# Patient Record
Sex: FEMALE | Race: WHITE | NOT HISPANIC OR LATINO | Employment: FULL TIME | URBAN - METROPOLITAN AREA
[De-identification: names, ages, dates, MRNs, and addresses within clinical notes are randomized per-mention and may not be internally consistent; named-entity substitution may affect disease eponyms.]

---

## 2017-03-06 DIAGNOSIS — R73.03 BORDERLINE DIABETES: ICD-10-CM

## 2017-03-06 DIAGNOSIS — E55.9 AVITAMINOSIS D: Primary | ICD-10-CM

## 2017-03-16 ENCOUNTER — LAB (OUTPATIENT)
Dept: ENDOCRINOLOGY | Age: 52
End: 2017-03-16

## 2017-03-16 ENCOUNTER — TRANSCRIBE ORDERS (OUTPATIENT)
Dept: ADMINISTRATIVE | Facility: HOSPITAL | Age: 52
End: 2017-03-16

## 2017-03-16 DIAGNOSIS — E55.9 AVITAMINOSIS D: ICD-10-CM

## 2017-03-16 DIAGNOSIS — R73.03 BORDERLINE DIABETES: ICD-10-CM

## 2017-03-16 DIAGNOSIS — Z12.31 VISIT FOR SCREENING MAMMOGRAM: Primary | ICD-10-CM

## 2017-03-17 LAB
25(OH)D3+25(OH)D2 SERPL-MCNC: 17.3 NG/ML (ref 30–100)
ALBUMIN SERPL-MCNC: 4.4 G/DL (ref 3.5–5.2)
ALBUMIN/GLOB SERPL: 1.6 G/DL
ALP SERPL-CCNC: 81 U/L (ref 39–117)
ALT SERPL-CCNC: 38 U/L (ref 1–33)
AST SERPL-CCNC: 25 U/L (ref 1–32)
BILIRUB SERPL-MCNC: 0.2 MG/DL (ref 0.1–1.2)
BUN SERPL-MCNC: 18 MG/DL (ref 6–20)
BUN/CREAT SERPL: 23.7 (ref 7–25)
C PEPTIDE SERPL-MCNC: 7.9 NG/ML (ref 1.1–4.4)
CALCIUM SERPL-MCNC: 9.6 MG/DL (ref 8.6–10.5)
CHLORIDE SERPL-SCNC: 101 MMOL/L (ref 98–107)
CHOLEST SERPL-MCNC: 202 MG/DL (ref 0–200)
CO2 SERPL-SCNC: 23.3 MMOL/L (ref 22–29)
CREAT SERPL-MCNC: 0.76 MG/DL (ref 0.57–1)
GLOBULIN SER CALC-MCNC: 2.7 GM/DL
GLUCOSE SERPL-MCNC: 80 MG/DL (ref 65–99)
HBA1C MFR BLD: 5.85 % (ref 4.8–5.6)
HDLC SERPL-MCNC: 30 MG/DL (ref 40–60)
LDLC SERPL CALC-MCNC: 108 MG/DL (ref 0–100)
MICROALBUMIN UR-MCNC: <3 UG/ML
POTASSIUM SERPL-SCNC: 4.7 MMOL/L (ref 3.5–5.2)
PROT SERPL-MCNC: 7.1 G/DL (ref 6–8.5)
SODIUM SERPL-SCNC: 139 MMOL/L (ref 136–145)
TRIGL SERPL-MCNC: 318 MG/DL (ref 0–150)
VLDLC SERPL CALC-MCNC: 63.6 MG/DL (ref 5–40)

## 2017-03-30 ENCOUNTER — OFFICE VISIT (OUTPATIENT)
Dept: ENDOCRINOLOGY | Age: 52
End: 2017-03-30

## 2017-03-30 VITALS
BODY MASS INDEX: 44.52 KG/M2 | DIASTOLIC BLOOD PRESSURE: 70 MMHG | WEIGHT: 267.2 LBS | SYSTOLIC BLOOD PRESSURE: 138 MMHG | HEIGHT: 65 IN

## 2017-03-30 DIAGNOSIS — E55.9 AVITAMINOSIS D: ICD-10-CM

## 2017-03-30 DIAGNOSIS — E78.5 HYPERLIPIDEMIA, UNSPECIFIED HYPERLIPIDEMIA TYPE: ICD-10-CM

## 2017-03-30 DIAGNOSIS — R53.82 CHRONIC FATIGUE: ICD-10-CM

## 2017-03-30 DIAGNOSIS — R63.5 ABNORMAL WEIGHT GAIN: ICD-10-CM

## 2017-03-30 DIAGNOSIS — I10 BENIGN ESSENTIAL HTN: ICD-10-CM

## 2017-03-30 DIAGNOSIS — E66.9 ADIPOSITY: ICD-10-CM

## 2017-03-30 DIAGNOSIS — R73.03 BORDERLINE DIABETES: Primary | ICD-10-CM

## 2017-03-30 PROCEDURE — 99214 OFFICE O/P EST MOD 30 MIN: CPT | Performed by: NURSE PRACTITIONER

## 2017-03-30 RX ORDER — ERGOCALCIFEROL 1.25 MG/1
CAPSULE ORAL
Qty: 24 CAPSULE | Refills: 1 | Status: SHIPPED | OUTPATIENT
Start: 2017-03-30

## 2017-03-30 RX ORDER — AMLODIPINE BESYLATE 10 MG/1
10 TABLET ORAL DAILY
Qty: 90 TABLET | Refills: 1 | Status: SHIPPED | OUTPATIENT
Start: 2017-03-30 | End: 2018-03-30

## 2017-03-30 RX ORDER — LIRAGLUTIDE 6 MG/ML
INJECTION SUBCUTANEOUS
Qty: 9 ML | Refills: 3 | Status: SHIPPED | OUTPATIENT
Start: 2017-03-30

## 2017-03-30 NOTE — PROGRESS NOTES
"Subjective   Rachel Villatoro is a 51 y.o. female is here today for follow-up.  Chief Complaint   Patient presents with   • borderline diabetes     recent labs; doesnt test BG   • Vitamin D Deficiency     stopped colestid & vascepa due to not being able to follow sig   • Hypertension     havent been taking the Vit D due to not calling and letting know refill needed   • Hyperlipidemia     /80  Ht 65\" (165.1 cm)  Wt 267 lb 3.2 oz (121 kg)  BMI 44.46 kg/m2  Allergies   Allergen Reactions   • Fenofibrate      Severe headaches,  Breaks out in sweat   • Paroxetine      hives  hives   • Shellfish-Derived Products    • Statins    • Iodine Rash     Past Medical History:   Diagnosis Date   • Endometriosis    • Hyperlipidemia    • Hypertension    • Metabolic disorder    • Vitamin D deficiency      Past Surgical History:   Procedure Laterality Date   • BREAST BIOPSY     • CHOLECYSTECTOMY     • OOPHORECTOMY      BOTH   • TUBAL ABDOMINAL LIGATION       Social History     Social History   • Marital status:      Spouse name: N/A   • Number of children: N/A   • Years of education: N/A     Social History Main Topics   • Smoking status: Never Smoker   • Smokeless tobacco: None   • Alcohol use None   • Drug use: None   • Sexual activity: Not Asked     Other Topics Concern   • None     Social History Narrative     History reviewed. No pertinent family history.  Current Outpatient Prescriptions on File Prior to Visit   Medication Sig   • EPINEPHrine (EPIPEN) 0.3 MG/0.3ML solution auto-injector injection EPINEPHrine 0.3 MG/0.3ML NATALY; Patient Sig: EPINEPHrine 0.3 MG/0.3ML NATALY INJECT ML  PRN; 0; 19-Jun-2013; Active   • fenofibrate micronized (ANTARA) 130 MG capsule Take 1 capsule by mouth daily.   • furosemide (LASIX) 40 MG tablet Take 1 tablet by mouth daily.   • Insulin Pen Needle 32G X 5 MM misc USE AS DIRECTED WITH INSULIN INJECTIONS   • metFORMIN (GLUCOPHAGE) 1000 MG tablet Take 1 tablet by mouth daily with breakfast.   • " olmesartan (BENICAR) 40 MG tablet Take 1 tablet by mouth Daily.   • potassium chloride (KLOR-CON) 8 MEQ CR tablet Take 1 tablet by mouth daily.   • VICTOZA 18 MG/3ML solution pen-injector Inject 1.8 mg Daily   • colestipol (COLESTID) 1 G tablet Take 3 tablets by mouth 2 (Two) Times a Day.   • VASCEPA 1 G capsule 2 po bid with food   • vitamin D (ERGOCALCIFEROL) 05097 UNITS capsule capsule Take 1 capsule by mouth once a week.     No current facility-administered medications on file prior to visit.        History of Present Illness  Encounter Diagnoses   Name Primary?   • Borderline diabetes Yes   • Hyperlipidemia, unspecified hyperlipidemia type    • Benign essential HTN    • Avitaminosis D    • Adiposity    • Chronic fatigue    • Abnormal weight gain      This is a 51-year-old female patient seen today for routine follow-up for the above-mentioned problems.  She had recent labs which were reviewed and discussed.  It is noted that her blood pressure was high on initial check at 150/80. It was rechecked and found to be 138/70.  She states she does check her blood pressure at home and that this is around what it normally runs.  She was started on Colestid and vascepa at her last visit but didn't take them related to the timing of Colestid with other medications and her work schedule and she was unsure about starting both of those medications for cholesterol.  She reports that she has been remodeling her kitchen and has been unable to eat healthy for quite a while.  She reports and very low energy and that she has not been taking vitamin D for about a month because of an issue with refills.  She also reports that she was working 2 jobs previously but she has now went down to one job and will be able to increase her exercise to help facilitate weight loss and take her medications as prescribed.  She reports that she takes her Benicar Victoza, fenofibrate, and metformin as indicated and she takes her Lasix every other day  with a potassium supplement.    She has had no acute illness her recent visit the emergency department since her last visit but she states she will be having surgery on her back for repair of a slipped disc.     The following portions of the patient's history were reviewed and updated as appropriate: allergies, current medications, past family history, past medical history, past social history, past surgical history and problem list.    Review of Systems   Constitutional: Negative for fatigue.   HENT: Negative for trouble swallowing.    Eyes: Negative for visual disturbance.   Respiratory: Negative for shortness of breath.    Cardiovascular: Negative for leg swelling.   Gastrointestinal: Negative for nausea.   Endocrine: Negative for polyphagia.   Genitourinary: Negative for urgency.   Musculoskeletal: Negative for joint swelling.   Skin: Negative for wound.   Allergic/Immunologic: Negative for immunocompromised state.   Neurological: Negative for numbness.   Hematological: Negative for adenopathy.   Psychiatric/Behavioral: The patient is not nervous/anxious.        Objective   Physical Exam   Constitutional: She is oriented to person, place, and time. She appears well-developed and well-nourished. No distress.   HENT:   Head: Normocephalic and atraumatic.   Right Ear: External ear normal.   Left Ear: External ear normal.   Nose: Nose normal.   Mouth/Throat: Oropharynx is clear and moist. No oropharyngeal exudate.   Eyes: EOM are normal. Pupils are equal, round, and reactive to light. Right eye exhibits no discharge. Left eye exhibits no discharge.   Neck: Trachea normal, normal range of motion and full passive range of motion without pain. Neck supple. No tracheal tenderness present. Carotid bruit is not present. No tracheal deviation, no edema and no erythema present. No thyroid mass and no thyromegaly present.   Cardiovascular: Normal rate, regular rhythm, normal heart sounds and intact distal pulses.  Exam  reveals no gallop and no friction rub.    No murmur heard.  Pulmonary/Chest: Effort normal and breath sounds normal. No stridor. No respiratory distress. She has no wheezes. She has no rales.   Abdominal: Soft. Bowel sounds are normal. She exhibits no distension.   Musculoskeletal: Normal range of motion. She exhibits no edema or deformity.   Lymphadenopathy:     She has no cervical adenopathy.   Neurological: She is alert and oriented to person, place, and time.   Skin: Skin is warm and dry. No rash noted. She is not diaphoretic. No erythema. No pallor.   Psychiatric: She has a normal mood and affect. Her behavior is normal. Judgment and thought content normal.   Nursing note and vitals reviewed.    Results for orders placed or performed in visit on 03/16/17   Comprehensive Metabolic Panel   Result Value Ref Range    Glucose 80 65 - 99 mg/dL    BUN 18 6 - 20 mg/dL    Creatinine 0.76 0.57 - 1.00 mg/dL    eGFR Non African Am 80 >60 mL/min/1.73    eGFR African Am 96 >60 mL/min/1.73    BUN/Creatinine Ratio 23.7 7.0 - 25.0    Sodium 139 136 - 145 mmol/L    Potassium 4.7 3.5 - 5.2 mmol/L    Chloride 101 98 - 107 mmol/L    Total CO2 23.3 22.0 - 29.0 mmol/L    Calcium 9.6 8.6 - 10.5 mg/dL    Total Protein 7.1 6.0 - 8.5 g/dL    Albumin 4.40 3.50 - 5.20 g/dL    Globulin 2.7 gm/dL    A/G Ratio 1.6 g/dL    Total Bilirubin 0.2 0.1 - 1.2 mg/dL    Alkaline Phosphatase 81 39 - 117 U/L    AST (SGOT) 25 1 - 32 U/L    ALT (SGPT) 38 (H) 1 - 33 U/L   Lipid Panel   Result Value Ref Range    Total Cholesterol 202 (H) 0 - 200 mg/dL    Triglycerides 318 (H) 0 - 150 mg/dL    HDL Cholesterol 30 (L) 40 - 60 mg/dL    VLDL Cholesterol 63.6 (H) 5 - 40 mg/dL    LDL Cholesterol  108 (H) 0 - 100 mg/dL   Vitamin D 25 Hydroxy   Result Value Ref Range    25 Hydroxy, Vitamin D 17.3 (L) 30.0 - 100.0 ng/mL   Hemoglobin A1c   Result Value Ref Range    Hemoglobin A1C 5.85 (H) 4.80 - 5.60 %   C-Peptide   Result Value Ref Range    C-Peptide 7.9 (H) 1.1 - 4.4  ng/mL   MicroAlbumin, Urine, Random   Result Value Ref Range    Microalbumin, Urine <3.0 Not Estab. ug/mL         Assessment/Plan   Rachel was seen today for borderline diabetes, vitamin d deficiency, hypertension and hyperlipidemia.    Diagnoses and all orders for this visit:    Borderline diabetes    Hyperlipidemia, unspecified hyperlipidemia type    Benign essential HTN    Avitaminosis D    Adiposity    Chronic fatigue    Abnormal weight gain    In summary, the patient was seen and examined. She had recent labs which were reviewed and she was provided a copy.  her most recent labs reveal that she has poor cholesterol control but she states that she will begin taking her Colestid and vascepa as prescribed.  As her blood pressure is still not at goal I have added amlodipine 10 mg 1 pill daily and asked that she continue to check her blood pressure at home.  No one her of the side effects and mechanism of action of this medication and asked that if she finds her blood pressure running too low for her to contact the office for further instruction.  As her vitamin D remains low she is to increase vitamin D to 50,000 units 1 capsule twice weekly.  She is to continue all her medications as prescribed.  A great deal of this appointment was spent counseling the patient on medication adherence and the risk of high cholesterol and hypertension.  She was given a written prescription for her Victoza as well.  I encouraged her to contact the office with any further questions or concerns prior to her next appointment and follow-up in 4 months with labs prior.    Medications at the end of this visit are as follows:    Current Outpatient Prescriptions:   •  colestipol (COLESTID) 1 G tablet, Take 3 tablets by mouth 2 (Two) Times a Day., Disp: 120 tablet, Rfl: 5  •  EPINEPHrine (EPIPEN) 0.3 MG/0.3ML solution auto-injector injection, EPINEPHrine 0.3 MG/0.3ML NATALY; Patient Sig: EPINEPHrine 0.3 MG/0.3ML NATALY INJECT ML  PRN; 0;  19-Jun-2013; Active, Disp: , Rfl:   •  fenofibrate micronized (ANTARA) 130 MG capsule, Take 1 capsule by mouth daily., Disp: , Rfl:   •  furosemide (LASIX) 40 MG tablet, Take 1 tablet by mouth daily., Disp: , Rfl:   •  Insulin Pen Needle 32G X 5 MM misc, USE AS DIRECTED WITH INSULIN INJECTIONS, Disp: , Rfl:   •  metFORMIN (GLUCOPHAGE) 1000 MG tablet, Take 1 tablet by mouth daily with breakfast., Disp: 60 tablet, Rfl: 5  •  olmesartan (BENICAR) 40 MG tablet, Take 1 tablet by mouth Daily., Disp: 90 tablet, Rfl: 0  •  potassium chloride (KLOR-CON) 8 MEQ CR tablet, Take 1 tablet by mouth daily., Disp: , Rfl:   •  VASCEPA 1 G capsule, 2 po bid with food, Disp: 120 capsule, Rfl: 4  •  VICTOZA 18 MG/3ML solution pen-injector, Inject 1.8 mg Daily, Disp: 9 mL, Rfl: 3  •  vitamin D (ERGOCALCIFEROL) 29634 UNITS capsule capsule, Take 1 capsule two times a week, Disp: 24 capsule, Rfl: 1  •  amLODIPine (NORVASC) 10 MG tablet, Take 1 tablet by mouth Daily., Disp: 90 tablet, Rfl: 1      Follow up in 4 months with labs prior  Increase vitamin D 50,000 units 1 capsule twice weekly  Restart Colestid and Vascepa   Amlodipine 10 mg 1 pill daily

## 2017-03-30 NOTE — PATIENT INSTRUCTIONS
Follow up in 4 months with labs prior  Increase vitamin D 50,000 units 1 capsule twice weekly  Restart Colestid and Vascepa   Amlodipine 10 mg 1 pill daily

## 2017-05-25 RX ORDER — OLMESARTAN MEDOXOMIL 40 MG/1
40 TABLET ORAL DAILY
Qty: 90 TABLET | Refills: 0 | Status: SHIPPED | OUTPATIENT
Start: 2017-05-25 | End: 2018-05-25

## 2017-06-12 ENCOUNTER — TRANSCRIBE ORDERS (OUTPATIENT)
Dept: ADMINISTRATIVE | Facility: HOSPITAL | Age: 52
End: 2017-06-12

## 2017-06-12 DIAGNOSIS — Z12.31 VISIT FOR SCREENING MAMMOGRAM: Primary | ICD-10-CM

## 2017-06-19 ENCOUNTER — HOSPITAL ENCOUNTER (OUTPATIENT)
Dept: MAMMOGRAPHY | Facility: HOSPITAL | Age: 52
Discharge: HOME | End: 2017-06-19
Admitting: OBSTETRICS & GYNECOLOGY

## 2017-06-19 DIAGNOSIS — Z12.31 VISIT FOR SCREENING MAMMOGRAM: ICD-10-CM

## 2017-06-19 PROCEDURE — 77063 BREAST TOMOSYNTHESIS BI: CPT

## 2017-06-19 PROCEDURE — G0202 SCR MAMMO BI INCL CAD: HCPCS

## 2017-08-02 DIAGNOSIS — R73.03 BORDERLINE DIABETES: Primary | ICD-10-CM

## 2017-08-02 DIAGNOSIS — E55.9 AVITAMINOSIS D: ICD-10-CM

## 2017-09-02 ENCOUNTER — RESULTS ENCOUNTER (OUTPATIENT)
Dept: ENDOCRINOLOGY | Age: 52
End: 2017-09-02

## 2017-09-02 DIAGNOSIS — R73.03 BORDERLINE DIABETES: ICD-10-CM

## 2017-09-02 DIAGNOSIS — E55.9 AVITAMINOSIS D: ICD-10-CM

## 2018-02-15 RX ORDER — LIRAGLUTIDE 6 MG/ML
INJECTION SUBCUTANEOUS
Refills: 3 | OUTPATIENT
Start: 2018-02-15

## 2018-05-15 ENCOUNTER — TRANSCRIBE ORDERS (OUTPATIENT)
Dept: ADMINISTRATIVE | Facility: HOSPITAL | Age: 53
End: 2018-05-15

## 2018-05-15 DIAGNOSIS — Z12.31 SCREENING MAMMOGRAM, ENCOUNTER FOR: Primary | ICD-10-CM

## 2018-06-25 ENCOUNTER — HOSPITAL ENCOUNTER (OUTPATIENT)
Dept: MAMMOGRAPHY | Facility: HOSPITAL | Age: 53
Discharge: HOME | End: 2018-06-25
Admitting: NURSE PRACTITIONER

## 2018-06-25 DIAGNOSIS — Z12.31 SCREENING MAMMOGRAM, ENCOUNTER FOR: ICD-10-CM

## 2018-06-25 PROCEDURE — 77063 BREAST TOMOSYNTHESIS BI: CPT

## 2018-06-25 PROCEDURE — 77067 SCR MAMMO BI INCL CAD: CPT

## 2018-08-03 ENCOUNTER — ON CAMPUS - OUTPATIENT (OUTPATIENT)
Dept: URBAN - METROPOLITAN AREA HOSPITAL 2 | Facility: HOSPITAL | Age: 53
End: 2018-08-03
Payer: COMMERCIAL

## 2018-08-03 ENCOUNTER — OFFICE (OUTPATIENT)
Dept: URBAN - METROPOLITAN AREA PATHOLOGY 4 | Facility: PATHOLOGY | Age: 53
End: 2018-08-03
Payer: COMMERCIAL

## 2018-08-03 VITALS
DIASTOLIC BLOOD PRESSURE: 56 MMHG | SYSTOLIC BLOOD PRESSURE: 113 MMHG | HEART RATE: 83 BPM | HEART RATE: 96 BPM | OXYGEN SATURATION: 95 % | SYSTOLIC BLOOD PRESSURE: 117 MMHG | RESPIRATION RATE: 16 BRPM | OXYGEN SATURATION: 100 % | SYSTOLIC BLOOD PRESSURE: 188 MMHG | DIASTOLIC BLOOD PRESSURE: 58 MMHG | HEART RATE: 87 BPM | HEART RATE: 95 BPM | SYSTOLIC BLOOD PRESSURE: 99 MMHG | SYSTOLIC BLOOD PRESSURE: 100 MMHG | DIASTOLIC BLOOD PRESSURE: 50 MMHG | DIASTOLIC BLOOD PRESSURE: 122 MMHG | OXYGEN SATURATION: 94 % | HEART RATE: 92 BPM | OXYGEN SATURATION: 96 % | WEIGHT: 265 LBS | HEART RATE: 90 BPM | OXYGEN SATURATION: 98 % | SYSTOLIC BLOOD PRESSURE: 107 MMHG | OXYGEN SATURATION: 97 % | HEIGHT: 65 IN | SYSTOLIC BLOOD PRESSURE: 125 MMHG | DIASTOLIC BLOOD PRESSURE: 73 MMHG | DIASTOLIC BLOOD PRESSURE: 45 MMHG | HEART RATE: 84 BPM | TEMPERATURE: 98.2 F | DIASTOLIC BLOOD PRESSURE: 48 MMHG | SYSTOLIC BLOOD PRESSURE: 96 MMHG

## 2018-08-03 DIAGNOSIS — K62.1 RECTAL POLYP: ICD-10-CM

## 2018-08-03 DIAGNOSIS — K57.30 DIVERTICULOSIS OF LARGE INTESTINE WITHOUT PERFORATION OR ABS: ICD-10-CM

## 2018-08-03 DIAGNOSIS — K64.8 OTHER HEMORRHOIDS: ICD-10-CM

## 2018-08-03 DIAGNOSIS — D12.2 BENIGN NEOPLASM OF ASCENDING COLON: ICD-10-CM

## 2018-08-03 DIAGNOSIS — Z12.11 ENCOUNTER FOR SCREENING FOR MALIGNANT NEOPLASM OF COLON: ICD-10-CM

## 2018-08-03 LAB
GI HISTOLOGY: A. UNSPECIFIED: (no result)
GI HISTOLOGY: B. UNSPECIFIED: (no result)
GI HISTOLOGY: PDF REPORT: (no result)

## 2018-08-03 PROCEDURE — 88305 TISSUE EXAM BY PATHOLOGIST: CPT | Mod: 33 | Performed by: INTERNAL MEDICINE

## 2018-08-03 PROCEDURE — 45380 COLONOSCOPY AND BIOPSY: CPT | Mod: 33 | Performed by: INTERNAL MEDICINE

## 2018-08-03 RX ADMIN — PROPOFOL: 10 INJECTION, EMULSION INTRAVENOUS at 10:47

## 2019-06-01 ENCOUNTER — TRANSCRIBE ORDERS (OUTPATIENT)
Dept: ADMINISTRATIVE | Facility: HOSPITAL | Age: 54
End: 2019-06-01

## 2019-06-01 DIAGNOSIS — Z00.00 WELLNESS EXAMINATION: Primary | ICD-10-CM

## 2019-06-01 DIAGNOSIS — Z13.6 ENCOUNTER FOR SCREENING FOR VASCULAR DISEASE: Primary | ICD-10-CM

## 2019-06-26 ENCOUNTER — HOSPITAL ENCOUNTER (OUTPATIENT)
Dept: CARDIOLOGY | Facility: HOSPITAL | Age: 54
End: 2019-06-26

## 2019-06-26 ENCOUNTER — TRANSCRIBE ORDERS (OUTPATIENT)
Dept: ADMINISTRATIVE | Facility: HOSPITAL | Age: 54
End: 2019-06-26

## 2019-06-26 ENCOUNTER — APPOINTMENT (OUTPATIENT)
Dept: CT IMAGING | Facility: HOSPITAL | Age: 54
End: 2019-06-26

## 2019-06-26 DIAGNOSIS — Z13.6 ENCOUNTER FOR SCREENING FOR VASCULAR DISEASE: Primary | ICD-10-CM

## 2019-07-01 ENCOUNTER — APPOINTMENT (OUTPATIENT)
Dept: CT IMAGING | Facility: HOSPITAL | Age: 54
End: 2019-07-01

## 2019-08-06 ENCOUNTER — HOSPITAL ENCOUNTER (OUTPATIENT)
Dept: CT IMAGING | Facility: HOSPITAL | Age: 54
Discharge: HOME | End: 2019-08-06

## 2019-08-06 ENCOUNTER — HOSPITAL ENCOUNTER (OUTPATIENT)
Dept: CARDIOLOGY | Facility: HOSPITAL | Age: 54
Discharge: HOME | End: 2019-08-06
Admitting: NURSE PRACTITIONER

## 2019-08-06 DIAGNOSIS — Z00.00 WELLNESS EXAMINATION: ICD-10-CM

## 2019-08-06 DIAGNOSIS — Z13.6 ENCOUNTER FOR SCREENING FOR VASCULAR DISEASE: ICD-10-CM

## 2019-08-06 LAB
BH CV XLRA MEAS - MID AO DIAM: 1.4 CM
BH CV XLRA MEAS - PAD LEFT ABI PT: 1.24
BH CV XLRA MEAS - PAD LEFT ARM: 128 MMHG
BH CV XLRA MEAS - PAD LEFT LEG PT: 159 MMHG
BH CV XLRA MEAS - PAD RIGHT ABI PT: 1.2
BH CV XLRA MEAS - PAD RIGHT ARM: 123 MMHG
BH CV XLRA MEAS - PAD RIGHT LEG PT: 153 MMHG
BH CV XLRA MEAS LEFT CCA RATIO VEL: -98.8 CM/SEC
BH CV XLRA MEAS LEFT ICA RATIO VEL: -90.1 CM/SEC
BH CV XLRA MEAS LEFT ICA/CCA RATIO: 0.91
BH CV XLRA MEAS RIGHT CCA RATIO VEL: -81.6 CM/SEC
BH CV XLRA MEAS RIGHT ICA RATIO VEL: -107 CM/SEC
BH CV XLRA MEAS RIGHT ICA/CCA RATIO: 1.3

## 2019-08-06 PROCEDURE — 75571 CT HRT W/O DYE W/CA TEST: CPT

## 2019-08-06 PROCEDURE — 93799 UNLISTED CV SVC/PROCEDURE: CPT

## 2019-08-13 ENCOUNTER — TRANSCRIBE ORDERS (OUTPATIENT)
Dept: ADMINISTRATIVE | Facility: HOSPITAL | Age: 54
End: 2019-08-13

## 2019-08-13 DIAGNOSIS — Z12.31 SCREENING MAMMOGRAM, ENCOUNTER FOR: Primary | ICD-10-CM

## 2019-08-21 ENCOUNTER — HOSPITAL ENCOUNTER (OUTPATIENT)
Dept: MAMMOGRAPHY | Facility: HOSPITAL | Age: 54
Discharge: HOME | End: 2019-08-21
Admitting: NURSE PRACTITIONER

## 2019-08-21 DIAGNOSIS — Z12.31 SCREENING MAMMOGRAM, ENCOUNTER FOR: ICD-10-CM

## 2019-08-21 PROCEDURE — 77063 BREAST TOMOSYNTHESIS BI: CPT

## 2019-08-21 PROCEDURE — 77067 SCR MAMMO BI INCL CAD: CPT

## 2020-08-14 ENCOUNTER — TRANSCRIBE ORDERS (OUTPATIENT)
Dept: ADMINISTRATIVE | Facility: HOSPITAL | Age: 55
End: 2020-08-14

## 2020-08-14 DIAGNOSIS — Z12.31 VISIT FOR SCREENING MAMMOGRAM: Primary | ICD-10-CM

## 2020-08-24 ENCOUNTER — APPOINTMENT (OUTPATIENT)
Dept: MAMMOGRAPHY | Facility: HOSPITAL | Age: 55
End: 2020-08-24

## 2020-08-24 ENCOUNTER — HOSPITAL ENCOUNTER (OUTPATIENT)
Dept: MAMMOGRAPHY | Facility: HOSPITAL | Age: 55
Discharge: HOME | End: 2020-08-24
Admitting: NURSE PRACTITIONER

## 2020-08-24 DIAGNOSIS — Z12.31 VISIT FOR SCREENING MAMMOGRAM: ICD-10-CM

## 2020-08-24 PROCEDURE — 77063 BREAST TOMOSYNTHESIS BI: CPT

## 2020-08-24 PROCEDURE — 77067 SCR MAMMO BI INCL CAD: CPT
